# Patient Record
Sex: MALE | URBAN - METROPOLITAN AREA
[De-identification: names, ages, dates, MRNs, and addresses within clinical notes are randomized per-mention and may not be internally consistent; named-entity substitution may affect disease eponyms.]

---

## 2019-03-19 ENCOUNTER — OFFICE VISIT (OUTPATIENT)
Dept: URBAN - METROPOLITAN AREA CLINIC 76 | Facility: CLINIC | Age: 84
End: 2019-03-19
Payer: COMMERCIAL

## 2019-03-19 DIAGNOSIS — H52.4 PRESBYOPIA: ICD-10-CM

## 2019-03-19 DIAGNOSIS — H26.493 OTHER SECONDARY CATARACT, BILATERAL: Primary | ICD-10-CM

## 2019-03-19 DIAGNOSIS — Z96.1 PRESENCE OF INTRAOCULAR LENS: ICD-10-CM

## 2019-03-19 DIAGNOSIS — H53.2 DIPLOPIA: ICD-10-CM

## 2019-03-19 PROCEDURE — 92014 COMPRE OPH EXAM EST PT 1/>: CPT | Performed by: OPHTHALMOLOGY

## 2019-03-19 ASSESSMENT — INTRAOCULAR PRESSURE
OD: 12
OS: 12

## 2019-03-19 ASSESSMENT — VISUAL ACUITY
OS: 20/30
OD: 20/40

## 2019-03-19 NOTE — IMPRESSION/PLAN
Impression: Diplopia: H53.2. OU. Plan: Continue to monitor Discussed added risk.  Advised patient he will still need prism in gls after yag laser

## 2019-03-19 NOTE — IMPRESSION/PLAN
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 Visually significant Plan: Discussed diagnosis in detail with patient. Recommend Yag OD then OS .  r/b/a of yag cap discussed, pt would like to proceed. Will rely on Dr Melany Villasenor for Primary and [address].

## 2019-04-01 ENCOUNTER — SURGERY (OUTPATIENT)
Dept: URBAN - METROPOLITAN AREA SURGERY 47 | Facility: SURGERY | Age: 84
End: 2019-04-01
Payer: MEDICARE

## 2019-04-01 PROCEDURE — 66821 AFTER CATARACT LASER SURGERY: CPT | Performed by: OPHTHALMOLOGY
